# Patient Record
Sex: FEMALE | Race: WHITE
[De-identification: names, ages, dates, MRNs, and addresses within clinical notes are randomized per-mention and may not be internally consistent; named-entity substitution may affect disease eponyms.]

---

## 2018-03-13 NOTE — EDPHY
H & P


Stated Complaint: arm burn 3/3, now nausea, dizzy, lower back pain


Time Seen by Provider: 03/13/18 17:53


HPI/ROS: 


HPI:  This is a 57-year-old female who presents with





Chief Complaint: arm burn 3/3, now nausea, dizzy, lower back pain





Location:  Right arm


Quality:  Burn


Duration:  10 days ago


Signs and Symptoms:  No bleeding, no radiation, no numbness, no weakness, no 

tingling, no incontinence, no decreased range of motion, no swelling, no pain


Timing:  Improved


Severity:  Moderate


Context:  Patient is left hand dominant, accidentally burned her right forearm 

with hot water draining macaroni on 03/03/2018. Patient reports that she had 

second-degree burns.  She has been cleaning the wounds daily with mild soap and 

water and applying topical antibiotic ointment.  She keeps clean sterile 

dressing covering the wound.  She was seen 2 days later in the [facility] 

emergency room and given local wound care along with 7 days of doxycycline.  

Patient reports that since that time he has had bilateral lower back pain with 

left radiculopathy down into her thigh, nausea and dizziness.  Denies 

paresthesias/weakness/fever/urinary symptoms/abdominal pain/nausea/vomiting.  

Patient is not sure if she is having a reaction to the doxycycline or for the 

burn is not making her feel well.  She is requesting full laboratory workup 

including blood culture.  Denies prior history of back pain/injury.  Patient is 

ambulatory without deficits.  Very reluctant to take any medications as she 

reports that she is"very sensitive."


Modifying Factors:  See above





Comment: 








ROS: see HPI


Constitutional: No fever, no chills, no weight loss


Eyes:  No blurred vision


Respiratory:  No shortness of breath, no cough


Cardiovascular:  No chest pain


Gastrointestinal:  + nausea, no vomiting no diarrhea 


Genitourinary:  No dysuria 


Extremities:  No myalgias 


Neurologic:  No weakness, no numbness


Skin:  No rashes


Hematologic:  No bruising, no bleeding








MEDICAL/SURGICAL/SOCIAL HISTORY: 


Medical history:  post menopausal, neck issues, chronic HA


Surgical history: tonsillectomy, eye surgery, vaginal sling, endometrial biopsy


Social history:  Employed. 








CONSTITUTIONAL:  Extremely well-appearing but anxious adult female, awake and 

alert, no obvious distress


HEENT: Atraumatic and normocephalic, PERRL, EOMI. Tympanic membranes clear. 

Oropharynx clear, no exudate and moist pink mucosa.  Airway patent.  No 

lymphadenopathy.  No meningismus.


Cardiovascular: Normal S1/S2, regular rate, regular rhythm, without murmur rub 

or gallop.


PULMONARY/CHEST:  Symmetrical and nontender. Clear to auscultation bilaterally. 

Good air movement. No accessory muscle usage.


ABDOMEN:  Soft, nondistended, nontender, no rebound, no guarding, no peritoneal 

signs, no masses or organomegaly. No CVAT.


BACK:  No midline tenderness, no paraspinous spasm, deep tendon reflexes 2/2, 

no pain with straight leg raise


EXTREMITIES:  2/2 pulses, strength 5/5, no deformities, no clubbing, no 

cyanosis or edema.


NEUROLOGICAL: no focal neuro deficits.  GCS 15.


SKIN: Warm and dry, 2 in x 2 in healing burn on the volar aspect of the right 

forearm; no erythema/fluctuance/drainage.  Good capillary refill. Good 

capillary refill. 











  





Source: Patient


Exam Limitations: No limitations





- Personal History


Current Tetanus/Diphtheria Vaccine: Yes


Current Tetanus Diphtheria and Acellular Pertussis (TDAP): Yes


Tetanus Vaccine Date: < 10 years





- Medical/Surgical History


Hx Asthma: No


Hx Chronic Respiratory Disease: No


Hx Diabetes: No


Hx Cardiac Disease: No


Hx Renal Disease: No


Hx Cirrhosis: No


Hx Alcoholism: No


Hx HIV/AIDS: No


Hx Splenectomy or Spleen Trauma: No


Other PMH: PMH:post menopausal, neck issues, chronic HA, tonselecomy, , eye 

surgery, vaginal sling, endometrial byopsy





- Social History


Smoking Status: Never smoked


Constitutional: 


 Initial Vital Signs











Temperature (C)  37 C   03/13/18 17:40


 


Heart Rate  109 H  03/13/18 17:40


 


Respiratory Rate  20   03/13/18 17:40


 


Blood Pressure  114/93 H  03/13/18 17:40


 


O2 Sat (%)  98   03/13/18 17:40








 











O2 Delivery Mode               Room Air














Allergies/Adverse Reactions: 


 





diphenhydramine [From Benadryl] Allergy (Verified 03/13/18 17:40)


 


latex Allergy (Verified 03/13/18 17:40)


 








Home Medications: 














 Medication  Instructions  Recorded


 


Ondansetron Odt [Zofran Odt 4 mg 4 mg PO Q4 PRN #12 tab 03/13/18





(*)]  














Medical Decision Making


ED Course/Re-evaluation: 


Labs including blood culture, urinalysis, 1 L normal saline and ibuprofen 

ordered


Vital signs reviewed and no systemic signs. 


Second-degree burn on her right forearm is greatly improved and there are no 

signs of infection. 


Labs reviewed and grossly unremarkable. 


Reassessed patient; multiple friends at bedside. 


For lumbar sacral x-rays for lower back and sciatic type pain; patient politely 

declined. 


Urinalysis shows no signs of infection. 











This patient was seen under the supervision of my secondary supervising 

physician.  I evaluated care for this patient independently.  





Differential Diagnosis: 


Differential diagnosis includes but is not limited to medication side effect, 

viral syndrome, sciatica, anxiety. 








- Data Points


Laboratory Results: 


 Laboratory Results





 03/13/18 18:00 





 03/13/18 18:00 





 











  03/13/18 03/13/18 03/13/18





  19:29 18:00 18:00


 


WBC      6.32 10^3/uL 10^3/uL





     (3.80-9.50) 


 


RBC      5.30 10^6/uL 10^6/uL





     (4.18-5.33) 


 


Hgb      15.9 g/dL g/dL





     (12.6-16.3) 


 


Hct      46.0 % %





     (38.0-47.0) 


 


MCV      86.8 fL fL





     (81.5-99.8) 


 


MCH      30.0 pg pg





     (27.9-34.1) 


 


MCHC      34.6 g/dL g/dL





     (32.4-36.7) 


 


RDW      12.6 % %





     (11.5-15.2) 


 


Plt Count      262 10^3/uL 10^3/uL





     (150-400) 


 


MPV      9.5 fL fL





     (8.7-11.7) 


 


Neut % (Auto)      77.3 % H %





     (39.3-74.2) 


 


Lymph % (Auto)      10.1 % L %





     (15.0-45.0) 


 


Mono % (Auto)      11.6 % %





     (4.5-13.0) 


 


Eos % (Auto)      0.2 % L %





     (0.6-7.6) 


 


Baso % (Auto)      0.5 % %





     (0.3-1.7) 


 


Nucleat RBC Rel Count      0.0 % %





     (0.0-0.2) 


 


Absolute Neuts (auto)      4.89 10^3/uL 10^3/uL





     (1.70-6.50) 


 


Absolute Lymphs (auto)      0.64 10^3/uL L 10^3/uL





     (1.00-3.00) 


 


Absolute Monos (auto)      0.73 10^3/uL 10^3/uL





     (0.30-0.80) 


 


Absolute Eos (auto)      0.01 10^3/uL L 10^3/uL





     (0.03-0.40) 


 


Absolute Basos (auto)      0.03 10^3/uL 10^3/uL





     (0.02-0.10) 


 


Absolute Nucleated RBC      0.00 10^3/uL 10^3/uL





     (0-0.01) 


 


Immature Gran %      0.3 % %





     (0.0-1.1) 


 


Immature Gran #      0.02 10^3/uL 10^3/uL





     (0.00-0.10) 


 


VBG Lactic Acid      





    


 


Sodium    136 mEq/L mEq/L  





    (135-145)  


 


Potassium    3.9 mEq/L mEq/L  





    (3.5-5.2)  


 


Chloride    96 mEq/L L mEq/L  





    ()  


 


Carbon Dioxide    28 mEq/l mEq/l  





    (22-31)  


 


Anion Gap    12 mEq/L mEq/L  





    (8-16)  


 


BUN    14 mg/dL mg/dL  





    (7-23)  


 


Creatinine    0.7 mg/dL mg/dL  





    (0.6-1.0)  


 


Estimated GFR    > 60   





    


 


Glucose    89 mg/dL mg/dL  





    ()  


 


Calcium    9.8 mg/dL mg/dL  





    (8.5-10.4)  


 


Total Bilirubin    0.5 mg/dL mg/dL  





    (0.1-1.4)  


 


Conjugated Bilirubin    0.2 mg/dL mg/dL  





    (0.0-0.5)  


 


Unconjugated Bilirubin    0.3 mg/dL mg/dL  





    (0.0-1.1)  


 


AST    30 IU/L IU/L  





    (14-46)  


 


ALT    34 IU/L IU/L  





    (9-52)  


 


Alkaline Phosphatase    79 IU/L IU/L  





    ()  


 


Total Protein    8.0 g/dL g/dL  





    (6.3-8.2)  


 


Albumin    4.9 g/dL g/dL  





    (3.5-5.0)  


 


Urine Color  COLORLESS     





    


 


Urine Appearance  CLEAR     





    


 


Urine pH  8.0  H     





   (5.0-7.5)   


 


Ur Specific Gravity  1.003     





   (1.002-1.030)   


 


Urine Protein  NEGATIVE     





   (NEGATIVE)   


 


Urine Ketones  NEGATIVE     





   (NEGATIVE)   


 


Urine Blood  NEGATIVE     





   (NEGATIVE)   


 


Urine Nitrate  NEGATIVE     





   (NEGATIVE)   


 


Urine Bilirubin  NEGATIVE     





   (NEGATIVE)   


 


Urine Urobilinogen  NEGATIVE EU EU    





   (0.2-1.0)   


 


Ur Leukocyte Esterase  NEGATIVE     





   (NEGATIVE)   


 


Urine Glucose  NEGATIVE     





   (NEGATIVE)   














  03/13/18





  18:00


 


WBC  





  


 


RBC  





  


 


Hgb  





  


 


Hct  





  


 


MCV  





  


 


MCH  





  


 


MCHC  





  


 


RDW  





  


 


Plt Count  





  


 


MPV  





  


 


Neut % (Auto)  





  


 


Lymph % (Auto)  





  


 


Mono % (Auto)  





  


 


Eos % (Auto)  





  


 


Baso % (Auto)  





  


 


Nucleat RBC Rel Count  





  


 


Absolute Neuts (auto)  





  


 


Absolute Lymphs (auto)  





  


 


Absolute Monos (auto)  





  


 


Absolute Eos (auto)  





  


 


Absolute Basos (auto)  





  


 


Absolute Nucleated RBC  





  


 


Immature Gran %  





  


 


Immature Gran #  





  


 


VBG Lactic Acid  1.9 mmol/L mmol/L





   (0.7-2.1) 


 


Sodium  





  


 


Potassium  





  


 


Chloride  





  


 


Carbon Dioxide  





  


 


Anion Gap  





  


 


BUN  





  


 


Creatinine  





  


 


Estimated GFR  





  


 


Glucose  





  


 


Calcium  





  


 


Total Bilirubin  





  


 


Conjugated Bilirubin  





  


 


Unconjugated Bilirubin  





  


 


AST  





  


 


ALT  





  


 


Alkaline Phosphatase  





  


 


Total Protein  





  


 


Albumin  





  


 


Urine Color  





  


 


Urine Appearance  





  


 


Urine pH  





  


 


Ur Specific Gravity  





  


 


Urine Protein  





  


 


Urine Ketones  





  


 


Urine Blood  





  


 


Urine Nitrate  





  


 


Urine Bilirubin  





  


 


Urine Urobilinogen  





  


 


Ur Leukocyte Esterase  





  


 


Urine Glucose  





  











Medications Given: 


 








Discontinued Medications





Sodium Chloride (Ns)  1,000 mls @ 0 mls/hr IV ONCE ONE; Wide Open


   PRN Reason: Protocol


   Stop: 03/13/18 18:01


   Last Admin: 03/13/18 18:09 Dose:  1,000 mls


Ibuprofen (Motrin)  800 mg PO EDNOW ONE


   Stop: 03/13/18 19:23


   Last Admin: 03/13/18 19:35 Dose:  Not Given








Departure





- Departure


Disposition: Home, Routine, Self-Care


Clinical Impression: 


 Medication side effects





Second degree burn of right wrist


Qualifiers:


 Encounter type: initial encounter Qualified Code(s): T23.271A - Burn of second 

degree of right wrist, initial encounter





Condition: Good


Instructions:  Ondansetron (By mouth), Second Degree Burn (ED), Cold Compress 

or Soak (ED)


Additional Instructions: 


Consume a minimum of 8-10 glasses of water or electrolyte fluid replacement 

drinks that include Gatorade, Powerade, Pedialyte. 


Eat a bland diet for the next 48 hours and then slowly advance as tolerated. 


Laboratory studies are completely within normal limits. If your blood cultures 

are positive the emergency room will contact you. 


Please continue to provide local burn care until fully healed. 


Doxycycline may need to be added to your allergy list as it appears you are 

having side effects from the medication. 


 


Referrals: 


Lizbeth Fletcher MD [Primary Care Provider] - 5-7 days, if not improved


Prescriptions: 


Ondansetron Odt [Zofran Odt 4 mg (*)] 4 mg PO Q4 PRN #12 tab


 PRN Reason: Nausea/Vomiting, Use 1st

## 2018-03-26 NOTE — GHP
[f 
rep st]



                                                            HISTORY AND PHYSICAL





DATE OF ADMISSION:  03/26/2018



CHIEF COMPLAINT:  Nausea and myalgias.



HISTORY OF PRESENT ILLNESS:  This is a 57-year-old female who describes herself 
as in good health and well usually.  Three weeks PTA she suffered a burn while 
cooking on the volar aspect of her right forearm.  She subsequently developed 
redness and swelling and was treated with oral doxycycline for possible 
infection.  She reports it had become a blister, which she broke, and then it 
became much redder and swollen, and the erythema, redness, and swelling 
extended almost to her elbow on the volar aspect.  By description, she was 
describing a strep infection.  It was treated with doxycycline 100 mg b.i.d.  
Within 24-48 hours, she began to develop epigastric pain, then nausea and had 
vomiting.  She was subsequently seen in this emergency department on 03/13 for 
the same.  At that time, she was complaining of lower back pain with left-sided 
radiculopathy into her thigh with nausea, dizziness, but not vomiting.  The 
problem has waxed and waned.  She was then seen by her PCP, and because of the 
persistence of it and the fact that it has not resolved and the fact that she 
feels like she is dehydrated, she was referred to the emergency department and 
now has been admitted. 



Patient reports that since she started the doxycycline, she had epigastric pain 
and a feeling of tightness.  Her appetite has been reduced.  At times she has 
nausea, but she chooses not to eat because it both causes pain and increases 
her sense of nausea, in addition to which this has intermittently been 
accompanied by lower abdominal pain and back pain, which is radiating into her 
buttocks with some left radiculopathy.  The lower abdominal pain and 
radiculopathy are not constant.  She cannot report any exacerbating or 
alleviating factors for this lower abdominal pain.  She has additionally had 
feelings of aching all over, which could best be described as a myalgia, but 
denies that she has had waldo arthritis or swelling of any joints, any rash, or 
clear muscle pain.  This is an otherwise very active woman who hikes frequently 
who now feels quite tired, complains of pain and aching all over, a feeling of 
nausea, pain in her epigastrium when she eats, and lower abdominal pain and 
back pain.  She denies a prior history of gastrointestinal disorder, heartburn, 
bowel disorder, renal infection, bladder infections. 



The symptoms above have not been associated with a waldo fever, chills, sweats, 
cough, chest pain, or shortness of breath.  She does report she has had a 
headache, which is primarily on the very top of her head.  She admits that the 
problems are causing a sense of anxiousness and resulting in sleeplessness.



PAST MEDICAL HISTORY:  She denies asthma, allergies, high blood pressure, 
diabetes, or renal problems.



PAST SURGICAL HISTORY:  None.



REVIEW OF SYSTEMS:  A 10-point review of systems is entirely negative as noted 
above.  Specifically, she has not had fever, chills, weight loss, shortness of 
breath, cough, or dysuria.  She is not moving her bowels very often, but she 
says she is not eating much, and her last bowel movement she describes as being 
normal.



PAST SURGICAL HISTORY:  She has had a tonsillectomy, eye surgery, a vaginal 
sling, and an endometrial biopsy.



SOCIAL HISTORY:  She is self-employed as she prepares meals for private 
clients.  Tobacco:  None.  Alcohol is social but not in abuse.



FAMILY HISTORY:  Noncontributory.  She has no history of arthritis, clotting 
disorder, bleeding diathesis, or connective tissue disorders.



PHYSICAL EXAMINATION:  GENERAL:  This is a thin anxious female who appears 
otherwise in no distress.  VITAL SIGNS:  Normal.  She is afebrile with normal 
oxygenation on room air.  HEENT:  Benign.  She has no lesions on her tongue, 
buccal mucosa.  Neck is supple without meningismus, and there is no trauma to 
the face or scalp.  MUSCULOSKELETAL:  Chest wall is nontender to palpation.  
The costosternal junctions are nontender.  LUNGS:  Clear to P and A.  HEART:  
Singular S1 and S2.  No murmur, gallop, rub.  JVP judged to be normal.  ABDOMEN
:  Thin.  Normoactive bowel sounds.  There is a sense of discomfort and some 
tenderness in the epigastrium, but there is also discomfort and tenderness in 
the lower quadrants bilaterally, but more on the right than the left, but there 
is no rebound.  EXTREMITIES:  Straight leg raise is negative bilaterally.  
Extremities show a healing wound on her right volar forearm without signs of 
infection, fluctuance, or cellulitis.  There are no track marks to suggest drug 
abuse.  NEUROLOGIC:  Oriented x3.  Slightly anxious and nervous female.  
Cranial nerves 2-12 are intact to specific testing.



LABORATORY:  Her WBC is normal but shows a prominent left shift with 
significant band formation.  WBC total was 3800 with a hemoglobin of 15.2.  
Chemistry panel is entirely normal with normal renal function.  TSH normal at 
1.7.  Lipase negative at 183.  Urine is entirely clear.  Flu swabs for A and B 
are negative, and her PCR is negative for RSV and mono.



ASSESSMENT:  Acute and persistent nausea and vomiting, status post a 7-day 
course of doxycycline approximately 3 weeks prior to admission.  Since that time
, she has had waxing and waning nausea, a loss of appetite, a feeling of 
myalgias intermittently without waldo fever or chills.  She has a normal white 
count with significant band formation of undetermined significance.  History 
would not indicate that there is an infectious agent here, although the patient 
reports significant aching and myalgias ongoingly.  There is also no history 
for travel or possible tick bites and no signs of the same.  The symptoms seem 
all to relate to the dose of doxycycline.  Plan here would be to place her on 
IV fluids for the night and give Zofran for her nausea and reassess in the a.m.
  Infectious Disease has been contacted and will possibly see the patient 
regarding the fact that she has significant bands. 



In addition to the above findings, the patient when examined from an oriental 
medicine perspective would demonstrate that she has a significant ShaoYang 
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  This 
syndrome could be precipitated by taking doxycycline, which is a significant 
stomach irritant and thus creating an axis of stagnation as those would be 
referred to in oriental medicine in the area of the stomach.  The 
symptomatology here, if other infectious agents and rheumatologic agents can be 
ruled out, would be amenable to acupuncture for stimulation.  I am going to 
suggest that if her rheumatoid factor, sedimentation rate, CRP, and FARAZ are 
negative that she be referred for acupuncture either in the hospital or as an 
outpatient. 



Patient will be admitted on observation status. 



She will be a full code.  DVT prophylaxis will be with ambulation.  She is a 
low risk.





Job #:  [id number]

MTDD

## 2018-03-26 NOTE — CPEKG
Heart Rate: 71

RR Interval: 845

P-R Interval: 172

QRSD Interval: 62

QT Interval: 384

QTC Interval: 418

P Axis: 48

QRS Axis: 3

T Wave Axis: 38

EKG Severity - ABNORMAL ECG -

EKG Impression: SINUS RHYTHM

EKG Impression: LOW VOLTAGE IN FRONTAL LEADS

EKG Impression: CONSIDER LEFT VENTRICULAR HYPERTROPHY

EKG Impression: ANTERIOR Q WAVES, POSSIBLY DUE TO LVH

Electronically Signed By: Apollo Wallace 26-Mar-2018 14:04:22

## 2018-03-26 NOTE — EDPHY
H & P


Time Seen by Provider: 03/26/18 12:57


HPI/ROS: 





CHIEF COMPLAINT:  Continued nausea and myalgias





HISTORY OF PRESENT ILLNESS:  Patient relates this all started about 3 weeks ago 

and she had a small burn on her volar right wrist.  Subsequently developed 

redness and swelling and she was treated with oral doxycycline for possible 

infection.  She subsequently started to get nausea and fatigue and feels 

dehydrated and thirsty with pain in her lower back radiating to both legs as 

well as her upper back radiating to both arms.  She was seen 2 weeks ago on 313 

the emergency department for same symptoms.  After that she went to [facility]

, felt so poorly she almost went to the ER, did go to [facility] 

emergency department last night and had IV fluids.  She comes here today from 

her [facility] primary care clinic office for evaluation.





Continued nausea and fatigue without diarrhea.  Some lower abdominal pain.  No 

urinary symptoms.  Feels better with IV fluids but recurrent and persistent for 

the last 3 weeks.  Not associated with recent changes in medications or foreign 

travel.





REVIEW OF SYSTEMS:


Eye: no change in vision


ENT: no sore throat


Cardiac: no chest pain or syncope


Pulmonary: no cough or SOB


Abdomen:  HPI


Musculoskeletal:  Myalgias as above no recent injury or trauma


Skin:  Right forearm redness 3 weeks ago after a burn no recent skin changes.


Neuro:  Chronic headaches which the patient says she is"had my whole life."


Constitutional: no fever


: no urinary symptoms





A comprehensive 10 point review of systems is otherwise negative aside from 

elements mentioned in the history of present illness.





PAST MEDICAL HISTORY:  Chronic headaches, tonsillectomy, vaginal sling 

procedure and endometrial biopsy





Social history:  No alcohol, nonsmoker, no drugs, no recent foreign travel.





General Appearance: Alert and conversant, cooperative.


Eyes: No scleral  icterus. 


ENT, Mouth:  Slightly dry mucous membranes.


Respiratory: Normal respiratory effort, breath sounds equal, lungs are clear to 

auscultation.


Cardiovascular:  Regular rate and rhythm.


Gastrointestinal:  Bilateral lower abdominal tenderness without rebound or 

guarding.


Neurological: Alert, face symmetric, normal motor and sensory in extremities. 


Skin: Warm and dry, no rashes.


Musculoskeletal: No peripheral edema.


Psychiatric: Not agitated.





Emergency Department course/MDM:


1339: [facility] at [place]; agrees patient should be admitted for further 

workup, has failed outpatient with emergency department clinic visits with 

worsening symptoms.





IV fluids, 4 mg IV Zofran for nausea.  I-STAT with CT scanning if normal 

creatinine, for persistent nausea with abdominal tenderness.  Sedimentation 

rate TSH and CPK.  Likely admission.


1449: [facility] to admit.


1520: Results/plan discussed with patient and her friend in the room.


Smoking Status: Current every day smoker


Constitutional: 


 Initial Vital Signs











Temperature (C)  37 C   03/26/18 12:27


 


Heart Rate  89   03/26/18 12:27


 


Respiratory Rate  16   03/26/18 12:27


 


Blood Pressure  98/74 L  03/26/18 12:27


 


O2 Sat (%)  97   03/26/18 12:27








 











O2 Delivery Mode               Room Air














Allergies/Adverse Reactions: 


 





diphenhydramine [From Benadryl] Allergy (Verified 03/13/18 17:40)


 


latex Allergy (Verified 03/13/18 17:40)


 








Home Medications: 














 Medication  Instructions  Recorded


 


Herbals/Supplements -Info Only 1 ea PO DAILY 03/26/18


 


Ibuprofen [Motrin (*)] 800 mg PO DAILY PRN 03/26/18














Medical Decision Making





- Diagnostics


EKG Interpretation: 


12-lead EKG interpreted by me; official reading is in trace master.  My 

interpretation is  Sinus rhythm, LVH, anterior Q-waves noted.  


Imaging Results: 


 Imaging Impressions





Abdomen CT  03/26/18 14:13


Impression:


1. Mild apparent gastric wall thickening, which could be related to 

underdistention or inflammation.


2. If the patient is a smoker or is high risk, unenhanced low dose chest CT for 

follow up in 12 months is considered optional. Otherwise, no further follow up 

is needed per Fleischner Society criteria.  


 


 


Findings discussed with Dr. Apollo Wallace on 3/26/2018 at 15:03. 








Negative CT per [facility] at 1504.


Imaging: Discussed imaging studies w/ On call Radiologist


Differential Diagnosis: 





Differential for nausea and myalgias considered including but not limited to 

viral syndrome, influenza, rhabdomyolysis, metabolic problem, rheumatologic 

disorder.





- Data Points


Laboratory Results: 


 Laboratory Results





 03/26/18 13:43 





 03/26/18 13:43 





 











  03/26/18 03/26/18 03/26/18





  13:49 13:43 13:43


 


WBC      





    


 


RBC      





    


 


Hgb      





    


 


POC Hgb  15.6 gm/dL gm/dL    





   (12.6-16.3)   


 


Hct      





    


 


POC Hct  46 % %    





   (38-47)   


 


MCV      





    


 


MCH      





    


 


MCHC      





    


 


RDW      





    


 


Plt Count      





    


 


MPV      





    


 


Neut % (Auto)      





    


 


Lymph % (Auto)      





    


 


Mono % (Auto)      





    


 


Eos % (Auto)      





    


 


Baso % (Auto)      





    


 


Nucleat RBC Rel Count      





    


 


Absolute Neuts (auto)      





    


 


Absolute Lymphs (auto)      





    


 


Absolute Monos (auto)      





    


 


Absolute Eos (auto)      





    


 


Absolute Basos (auto)      





    


 


Absolute Nucleated RBC      





    


 


Immature Gran %      





    


 


Seg Neutrophils %      





    


 


Band Neutrophils %      





    


 


Lymphocytes %      





    


 


Monocytes %      





    


 


Immature Gran #      





    


 


Absolute Seg Neuts      





    


 


Absolute Band Neuts      





    


 


Absolute Lymphocytes      





    


 


Absolute Monocytes      





    


 


Atypical Lymphocytes      





    


 


Toxic Vacuolation      





    


 


Platelet Estimate      





    


 


Microcytic Cells      





    


 


Smear Review By      





    


 


ESR      





    


 


POC Sodium  140 mEq/L mEq/L    





   (135-145)   


 


Sodium      140 mEq/L mEq/L





     (135-145) 


 


POC Potassium  3.9 mEq/L mEq/L    





   (3.3-5.0)   


 


Potassium      4.2 mEq/L mEq/L





     (3.5-5.2) 


 


POC Chloride  99 mEq/L mEq/L    





   ()   


 


Chloride      100 mEq/L mEq/L





     () 


 


Carbon Dioxide      29 mEq/l mEq/l





     (22-31) 


 


Anion Gap      11 mEq/L mEq/L





     (8-16) 


 


POC BUN  14 mg/dL mg/dL    





   (7-23)   


 


BUN      15 mg/dL mg/dL





     (7-23) 


 


Creatinine      0.6 mg/dL mg/dL





     (0.6-1.0) 


 


POC Creatinine  0.7 mg/dL mg/dL    





   (0.6-1.0)   


 


Estimated GFR      > 60 





    


 


Glucose      78 mg/dL mg/dL





     () 


 


POC Glucose  86 mg/dL mg/dL    





   ()   


 


Calcium      8.8 mg/dL mg/dL





     (8.5-10.4) 


 


Total Bilirubin      0.4 mg/dL mg/dL





     (0.1-1.4) 


 


Conjugated Bilirubin      0.2 mg/dL mg/dL





     (0.0-0.5) 


 


Unconjugated Bilirubin      0.2 mg/dL mg/dL





     (0.0-1.1) 


 


AST      40 IU/L IU/L





     (14-46) 


 


ALT      34 IU/L IU/L





     (9-52) 


 


Alkaline Phosphatase      62 IU/L IU/L





     () 


 


Creatine Kinase      69 IU/L IU/L





     (0-156) 


 


Troponin I      < 0.012 ng/mL ng/mL





     (0.000-0.034) 


 


Total Protein      6.6 g/dL g/dL





     (6.3-8.2) 


 


Albumin      4.0 g/dL g/dL





     (3.5-5.0) 


 


Lipase      183 IU/L IU/L





     () 


 


TSH      1.740 uIU/mL uIU/mL





     (0.465-4.680) 


 


Urine Color    YELLOW   





    


 


Urine Appearance    CLEAR   





    


 


Urine pH    7.0   





    (5.0-7.5)  


 


Ur Specific Gravity    1.019   





    (1.002-1.030)  


 


Urine Protein    NEGATIVE   





    (NEGATIVE)  


 


Urine Ketones    NEGATIVE   





    (NEGATIVE)  


 


Urine Blood    NEGATIVE   





    (NEGATIVE)  


 


Urine Nitrate    NEGATIVE   





    (NEGATIVE)  


 


Urine Bilirubin    NEGATIVE   





    (NEGATIVE)  


 


Urine Urobilinogen    NEGATIVE EU EU  





    (0.2-1.0)  


 


Ur Leukocyte Esterase    NEGATIVE   





    (NEGATIVE)  


 


Urine Glucose    NEGATIVE   





    (NEGATIVE)  














  03/26/18





  13:43


 


WBC  3.82 10^3/uL 10^3/uL





   (3.80-9.50) 


 


RBC  5.10 10^6/uL 10^6/uL





   (4.18-5.33) 


 


Hgb  15.2 g/dL g/dL





   (12.6-16.3) 


 


POC Hgb  





  


 


Hct  43.3 % %





   (38.0-47.0) 


 


POC Hct  





  


 


MCV  84.9 fL fL





   (81.5-99.8) 


 


MCH  29.8 pg pg





   (27.9-34.1) 


 


MCHC  35.1 g/dL g/dL





   (32.4-36.7) 


 


RDW  12.3 % %





   (11.5-15.2) 


 


Plt Count  222 10^3/uL 10^3/uL





   (150-400) 


 


MPV  9.7 fL fL





   (8.7-11.7) 


 


Neut % (Auto)  64.9 % %





   (39.3-74.2) 


 


Lymph % (Auto)  25.4 % %





   (15.0-45.0) 


 


Mono % (Auto)  8.4 % %





   (4.5-13.0) 


 


Eos % (Auto)  0.0 % L %





   (0.6-7.6) 


 


Baso % (Auto)  0.8 % %





   (0.3-1.7) 


 


Nucleat RBC Rel Count  0.0 % %





   (0.0-0.2) 


 


Absolute Neuts (auto)  2.48 10^3/uL 10^3/uL





   (1.70-6.50) 


 


Absolute Lymphs (auto)  0.97 10^3/uL L 10^3/uL





   (1.00-3.00) 


 


Absolute Monos (auto)  0.32 10^3/uL 10^3/uL





   (0.30-0.80) 


 


Absolute Eos (auto)  0.00 10^3/uL L 10^3/uL





   (0.03-0.40) 


 


Absolute Basos (auto)  0.03 10^3/uL 10^3/uL





   (0.02-0.10) 


 


Absolute Nucleated RBC  0.00 10^3/uL 10^3/uL





   (0-0.01) 


 


Immature Gran %  0.5 % %





   (0.0-1.1) 


 


Seg Neutrophils %  19 % %





  


 


Band Neutrophils %  46 % %





  


 


Lymphocytes %  30 % %





  


 


Monocytes %  5 % %





  


 


Immature Gran #  0.02 10^3/uL 10^3/uL





   (0.00-0.10) 


 


Absolute Seg Neuts  0.73 10^/uL L 10^/uL





   (1.70-6.50) 


 


Absolute Band Neuts  1.76 10^3/uL H 10^3/uL





   (0.00-0.70) 


 


Absolute Lymphocytes  1.15 10^3/uL 10^3/uL





   (1.00-3.00) 


 


Absolute Monocytes  0.19 10^3/uL L 10^3/uL





   (0.30-0.80) 


 


Atypical Lymphocytes  2+  H 





  


 


Toxic Vacuolation  PRESENT  H 





  


 


Platelet Estimate  ADEQUATE 





   (ADEQ) 


 


Microcytic Cells  1+  H 





  


 


Smear Review By  Pending 





  


 


ESR  Pending 





  


 


POC Sodium  





  


 


Sodium  





  


 


POC Potassium  





  


 


Potassium  





  


 


POC Chloride  





  


 


Chloride  





  


 


Carbon Dioxide  





  


 


Anion Gap  





  


 


POC BUN  





  


 


BUN  





  


 


Creatinine  





  


 


POC Creatinine  





  


 


Estimated GFR  





  


 


Glucose  





  


 


POC Glucose  





  


 


Calcium  





  


 


Total Bilirubin  





  


 


Conjugated Bilirubin  





  


 


Unconjugated Bilirubin  





  


 


AST  





  


 


ALT  





  


 


Alkaline Phosphatase  





  


 


Creatine Kinase  





  


 


Troponin I  





  


 


Total Protein  





  


 


Albumin  





  


 


Lipase  





  


 


TSH  





  


 


Urine Color  





  


 


Urine Appearance  





  


 


Urine pH  





  


 


Ur Specific Gravity  





  


 


Urine Protein  





  


 


Urine Ketones  





  


 


Urine Blood  





  


 


Urine Nitrate  





  


 


Urine Bilirubin  





  


 


Urine Urobilinogen  





  


 


Ur Leukocyte Esterase  





  


 


Urine Glucose  





  











Medications Given: 


 








Discontinued Medications





Sodium Chloride (Ns)  1,000 mls @ 0 mls/hr IV EDNOW ONE; Wide [facility]


   PRN Reason: Protocol


   Stop: 03/26/18 13:25


   Last Admin: 03/26/18 13:40 Dose:  1,000 mls


Ondansetron HCl (Zofran)  4 mg IVP EDNOW ONE


   Stop: 03/26/18 13:25


   Last Admin: 03/26/18 13:40 Dose:  4 mg





Point of Care Test Results: 


 











  03/26/18





  13:49


 


POC Sodium  140


 


POC Potassium  3.9


 


POC Chloride  99


 


POC BUN  14


 


POC Creatinine  0.7


 


POC Glucose  86














Departure





- Departure


Disposition: [facility]


Clinical Impression: 


 Nausea alone, Myalgia





Condition: Good

## 2018-03-27 NOTE — SOAPPROG
SOAP Progress Note


Assessment/Plan: 


Assessment:Qi deficency, Stagnation of Qi and Blood, rebellious stomach Qi


























Plan:Treatment today, recommend to patient to follow up after discharge from 

hospital


Move Qi and blood, boost Qi, Calm Jackson, Settle Stomach Qi.


Treatment: ST 36 , Sp 6, Rhonda 3, P6, H7, Baldomero 6, 12, ear: R jackson men, Sympathetic

, Rhonda, Pt zero.





03/27/18 12:34





Subjective: 





Acupuncture consult


Symptoms include whole body pain, not constant, in fact  better today. Has been 

dehydrated at times and sometimes receiving fluids has helped. Also nausea, 

fairly significant today. 


History of symptoms began after a burn on her arm, received antibiotics, she 

suspects some reaction to those medications.


Has been to ER 4 times in the last 2 weeks. Admitted to hospital on Monday.


She reports being sensitive to medications.


Objective: 





 Vital Signs











Temp Pulse Resp BP Pulse Ox


 


 36.7 C   74   16   100/71   96 


 


 03/27/18 11:29  03/27/18 11:29  03/27/18 11:29  03/27/18 11:29  03/27/18 11:29








 Laboratory Results





 03/27/18 04:25 





 











 03/26/18 03/27/18 03/28/18





 05:59 05:59 05:59


 


Intake Total  1000 


 


Balance  1000 








Patient appears quite well oriented, understandably aggravated by her situation

, and by the lack of obvious source of her symptoms. She shows clear signs of 

nausea that comes and goes. Pulse very thin, weak.





ICD10 Worksheet


Patient Problems: 


 Problems











Problem Status Onset


 


Myalgia Acute  


 


Nausea alone Acute  


 


Medication side effects Acute  


 


Second degree burn of right wrist Acute

## 2018-03-27 NOTE — GDS
[f rep st]



                                                             DISCHARGE SUMMARY





DISCHARGE DIAGNOSES:  Include:

1.  Acute nausea.  

2.  Myalgias.



HISTORY OF PRESENT ILLNESS:  A 57-year-old female who presents with complaints of severe nausea, ligh
theadedness, and myalgias that she attributes to a drug reaction to doxycycline several weeks precedi
ng her presentation.  The patient reports having sought care in multiple emergency rooms before comin
g to [facility].  For details of the patient's initial presentation, please see the history and physical osiris
ed 03/26/2018.



CONSULTATIVE SERVICES:  Include Integrative Medicine, Acupuncture.



PROCEDURES:  Patient received an acupuncture session prior to disposition.



HOSPITAL COURSE BY ISSUE:  

1.  Nausea.  The patient was aggressively fluid resuscitated and treated with IV antiemetics during h
er stay.  The morning after admission, her nausea has markedly improved.  She is tolerating a regular
 diet without ongoing nausea.  We made recommendations that she make a focused effort on maintaining 
hydration in the outpatient setting.  Would like to avoid any additional medications as I believe she
 is quite sensitive.

2.  Myalgias/body pain.  The patient's laboratory workup was negative including CBC, viral workup for
 influenza, RSV, TSH, urinalysis, rheumatoid factor.  I am reassured by this initial negative workup.
  After discussion with the patient, we did seek consultation with Integrative Medicine who provided 
an integrative care consultation.  She will follow in the outpatient setting with this integrative pr
ovider.  Again, I have not recommended that we add any pain medications or other pharmaceuticals to h
er list at disposition.  I think best that she continue seeking care from integrative providers.



MEDICATIONS:  At the time of disposition, please reference the med rec.  No new medications were adde
d to this patient's list.



FOLLOWUP APPOINTMENTS:  Include with the integrative provider, Jose Miguel Roberts, for ongoing manageme
nt of her symptoms.



PENDING STUDIES:  At the time of this dictation include an FARAZ screen.  I have asked that she see her
 primary care provider in 1-2 weeks to follow up on this pending laboratory and her overall progress.




TIME SPENT:  I spent greater than 30 minutes in the planning and coordination of this discharge.





Job #:  [id number]

## 2018-03-27 NOTE — ASMTCASEMG
Living Arrangements

 

What is your living           Answers:  Alone                                 

arrangement? Who do you                                                       

live with?                                                                    

Type Of Residence

 

What kind of residence do     Answers:  Apartment                             

you live in?                                                                  

Discharge Plan Comments

 

Coordination Status           

Comments                      

Notes:

Pt is a 58 y/o female admitted for intractable nausea. ID has been consulted. Pt will most likely 

d/c independent when medically stable. CM spoke w/ STORMY Hickman regarding d/c POC. No therapies ordered 


at this time. CM available for changes.







Plan: Independent 

 

Date Signed:  03/27/2018 12:24 PM

Electronically Signed By:SINDY Ashby

## 2019-06-11 ENCOUNTER — HOSPITAL ENCOUNTER (OUTPATIENT)
Dept: HOSPITAL 80 - FIMAGING | Age: 59
End: 2019-06-11
Payer: MEDICAID